# Patient Record
Sex: MALE | Race: WHITE | URBAN - METROPOLITAN AREA
[De-identification: names, ages, dates, MRNs, and addresses within clinical notes are randomized per-mention and may not be internally consistent; named-entity substitution may affect disease eponyms.]

---

## 2019-03-27 ENCOUNTER — APPOINTMENT (RX ONLY)
Dept: URBAN - METROPOLITAN AREA CLINIC 26 | Facility: CLINIC | Age: 64
Setting detail: DERMATOLOGY
End: 2019-03-27

## 2019-03-27 VITALS — DIASTOLIC BLOOD PRESSURE: 79 MMHG | SYSTOLIC BLOOD PRESSURE: 117 MMHG | HEART RATE: 70 BPM

## 2019-03-27 PROBLEM — D04.4 CARCINOMA IN SITU OF SKIN OF SCALP AND NECK: Status: ACTIVE | Noted: 2019-03-27

## 2019-03-27 PROCEDURE — 17311 MOHS 1 STAGE H/N/HF/G: CPT

## 2019-03-27 PROCEDURE — 13132 CMPLX RPR F/C/C/M/N/AX/G/H/F: CPT

## 2019-03-27 PROCEDURE — ? MOHS SURGERY

## 2019-03-27 NOTE — PROCEDURE: MOHS SURGERY
Patient states the pharmacy will not fill her Norco and Tramadol.States someone from our office needs to call to ok this.    Bilobed Transposition Flap Text: The defect edges were debeveled with a #15 scalpel blade.  Given the location of the defect and the proximity to free margins a bilobed transposition flap was deemed most appropriate.  Using a sterile surgical marker, an appropriate bilobe flap drawn around the defect.    The area thus outlined was incised deep to adipose tissue with a #15 scalpel blade.  The skin margins were undermined to an appropriate distance in all directions utilizing iris scissors.

## 2019-03-27 NOTE — PROCEDURE: MOHS SURGERY
At request of Jimy Herbert,  visited with 2 sons and a daughter as patient received breathing treatment. Family says patient used to be involved in [employer] but not currently.  offered to contact [employer] if patient and spouse wish. Daughter said she would ask spouse if he would like [employer] to be contacted.  gave business card and assured of chaplains' care/availability. Show Home Suture Removal Variable: Yes

## 2020-01-06 NOTE — PROCEDURE: MOHS SURGERY
Hpi Title: Evaluation of Skin Lesions How Severe Are Your Spot(S)?: mild Have Your Spot(S) Been Treated In The Past?: has not been treated Family Member: Mother and father Subsequent Stages Histo Method Verbiage: Using a similar technique to that described above, a thin layer of tissue was removed from all areas where tumor was visible on the previous stage.  The tissue was again oriented, mapped, dyed, and processed as above.

## 2021-03-04 NOTE — PROCEDURE: MOHS SURGERY
Follow up call placed to patient, she stated she is feeling better.  She will continue her dose of Tirosint.  She stated her symptoms were probably secondary to stress with buying and selling her house   Full Thickness Lip Wedge Repair (Flap) Text: Given the location of the defect and the proximity to free margins a full thickness wedge repair was deemed most appropriate.  Using a sterile surgical marker, the appropriate repair was drawn incorporating the defect and placing the expected incisions perpendicular to the vermilion border.  The vermilion border was also meticulously outlined to ensure appropriate reapproximation during the repair.  The area thus outlined was incised through and through with a #15 scalpel blade.  The muscularis and dermis were reaproximated with deep sutures following hemostasis. Care was taken to realign the vermilion border before proceeding with the superficial closure.  Once the vermilion was realigned the superfical and mucosal closure was finished.

## 2025-07-22 NOTE — PROCEDURE: MOHS SURGERY
Anesthesia Pre Eval Note    Anesthesia ROS/Med Hx    Overall Review:  EKG was reviewed     Anesthetic Complication History:    Patient does not have a history of anesthetic complications      Pulmonary Review:  Patient does not have a pulmonary history      Neuro/Psych Review:  Patient does not have a neuro/psych history         Cardiovascular Review:     Positive for dysrhythmias  Positive for DVT/PE    GI/HEPATIC/RENAL Review:   Comments: S/P Whipple Procedure    End/Other Review:    Positive for hypothyroidism  Additional Results:      ALLERGIES:   -- Dust Mite Extract -- PRURITUS, RASH and Runny Nose   No results found for: \"WBC\", \"RBC\", \"HGB\", \"HCT\", \"MCV\", \"MCH\", \"MCHC\", \"RDWCV\", \"SODIUM\", \"POTASSIUM\", \"CHLORIDE\", \"CO2\", \"GLUCOSE\", \"BUN\", \"CREATININE\", \"GFRESTIMATE\", \"EGFRNONAFR\", \"GFRA\", \"GFRNA\", \"CALCIUM\", \"HCG\", \"PLT\", \"PTT\", \"INR\"   Past Medical History:  No date: A-fib  (CMD)  No date: Depression  No date: Neuropathy      Comment:  FEET AND HANDS  Past Surgical History:  No date: Removal gallbladder  No date: Whipple procedure   Prior to Admission medications :  Medication lipase-protease-amylase 36,000-114,000-180,000 units (CREON) 63204 units per capsule, Sig Take 1 capsule by mouth in the morning and 1 capsule at noon and 1 capsule in the evening. Take with meals., Start Date 7/9/25, End Date , Taking? Yes, Authorizing Provider Deedee Wilson MD    Medication Linzess 145 MCG capsule, Sig Take 1 capsule by mouth daily (before breakfast)., Start Date 2/15/24, End Date , Taking? Yes, Authorizing Provider Deedee Wilson MD    Medication fluticasone (FLONASE) 50 MCG/ACT nasal spray, Sig Spray 1 spray in each nostril daily. 1 spray each side of nose daily, Start Date 1/11/24, End Date , Taking? Yes, Authorizing Provider Sharon Edmonds MD    Medication albuterol 108 (90 Base) MCG/ACT inhaler, Sig Inhale 2 puffs into the lungs every 6 hours as needed for Shortness of Breath or Wheezing., Start Date  12/21/23, End Date , Taking? Yes, Authorizing Provider Sharon Edmonds MD    Medication metoPROLOL tartrate (LOPRESSOR) 25 MG tablet, Sig Take 0.5 tablets by mouth 2 times daily., Start Date 5/17/22, End Date , Taking? Yes, Authorizing Provider Tristan Neal MD    Medication pantoprazole (Protonix) 40 MG packet for oral suspension, Sig Take by mouth daily., Start Date , End Date , Taking? Yes, Authorizing Provider Provider, Outside    Medication DULoxetine (CYMBALTA) 30 MG capsule, Sig 1 tablet qam, Start Date 1/27/22, End Date , Taking? Yes, Authorizing Provider Provider, Outside    Medication fluticasone-salmeterol (Wixela Inhub) 250-50 MCG/ACT inhaler, Sig Inhale 1 puff into the lungs in the morning and 1 puff in the evening., Start Date 6/24/25, End Date , Taking? , Authorizing Provider Sharon Edmonds MD    Medication rivaroxaban (Xarelto) 20 MG Tab, Sig Take 1 tablet by mouth daily., Start Date 5/17/22, End Date , Taking? , Authorizing Provider Tristan Neal MD    Medication traZODone (DESYREL) 50 MG tablet, Sig daily., Start Date 2/6/22, End Date , Taking? , Authorizing Provider Provider, Outside        Social history reviewed:  Social History     Tobacco Use   Smoking Status Never   Smokeless Tobacco Never        E-Cigarette/Vaping Substances & Devices   • E-Cigarette/Vaping Use Never Used        Social History     Substance and Sexual Activity   Alcohol Use Yes    Comment: social         Relevant Problems   No relevant active problems       Physical Exam     Airway   Mallampati: I  TM Distance: >3 FB  Neck ROM: Full  Neck: Able to place in sniff position  TMJ Mobility: Good    Cardiovascular  Cardiovascular exam normal    Head Assessment  Head assessment: Normocephalic    General Assessment  General Assessment: Alert and oriented    Dental Exam  Dental exam normal    Pulmonary Exam  Pulmonary exam normal    Vitals:   Patient Vitals for the past 4 hrs (Last 1 readings):   BP Temp Temp src  Pulse Resp SpO2 Height Weight   07/22/25 1018 116/63 35.9 °C (96.6 °F) Temporal 75 (!) 22 98 % -- --   07/22/25 1000 -- -- -- -- -- -- 5' 3\" (1.6 m) 45.8 kg (101 lb)         Anesthesia Plan:  No phone call attempted due to:  ASA Status: 3  Anesthesia Type: General    Preferred Airway Type: Nasal Cannula  Patient does not have a difficult airway or is not at risk of aspiration.   Maintenance: TIVA  Premedication: None    Patient does not have an implantable electronic device requiring post procedure programming.     Post-op Pain Management: Per Surgeon      Checklist  Reviewed: Lab Results, EKG, Problem list, Past Med History, NPO Status, Allergies and Medications  Consent/Risks Discussed Statement:  The proposed anesthetic plan, including its risks and benefits, have been discussed with the Patient and Spouse along with the risks and benefits of alternatives. Questions were encouraged and answered and the patient and/or representative understands and agrees to proceed.    I have discussed elements of the patient's history or examination, as noted above and/or as follows, that place the patient at higher risk of complications; BMI> 30 (obesity), hematological disease and pulmonary disease.    I discussed with the patient (and/or patient's legal representative) the risks and benefits of the proposed anesthesia plan, General, which may include services performed by other anesthesia providers.    Alternative anesthesia plans, if available, were reviewed with the patient (and/or patient's legal representative). Discussion has been held with the patient (and/or patient's legal representative) regarding risks of anesthesia, which include vomiting, eye injury, sore throat, dental injury, oral injury, conversion to general anesthesia, anxiety, allergic reaction and nausea and emergent situations that may require change in anesthesia plan.    The patient (and/or patient's legal representative) has indicated understanding,  his/her questions have been answered, and he/she wishes to proceed with the planned anesthetic.    Blood Products: Not Anticipated   Complex Repair Preamble Text (Leave Blank If You Do Not Want): Three layered closure, including plication of the fascia underlying the defect, as well as extensive wide undermining were performed.